# Patient Record
Sex: FEMALE | Race: WHITE | Employment: FULL TIME | URBAN - METROPOLITAN AREA
[De-identification: names, ages, dates, MRNs, and addresses within clinical notes are randomized per-mention and may not be internally consistent; named-entity substitution may affect disease eponyms.]

---

## 2017-02-21 PROCEDURE — 82784 ASSAY IGA/IGD/IGG/IGM EACH: CPT | Performed by: FAMILY MEDICINE

## 2017-02-21 PROCEDURE — 86255 FLUORESCENT ANTIBODY SCREEN: CPT | Performed by: FAMILY MEDICINE

## 2017-02-21 PROCEDURE — 36415 COLL VENOUS BLD VENIPUNCTURE: CPT | Performed by: FAMILY MEDICINE

## 2017-02-21 PROCEDURE — 83516 IMMUNOASSAY NONANTIBODY: CPT | Performed by: FAMILY MEDICINE

## 2017-05-30 PROBLEM — H81.11 BPPV (BENIGN PAROXYSMAL POSITIONAL VERTIGO), RIGHT: Status: ACTIVE | Noted: 2017-05-30

## 2019-09-20 PROCEDURE — 88175 CYTOPATH C/V AUTO FLUID REDO: CPT | Performed by: INTERNAL MEDICINE

## 2020-11-18 PROBLEM — G44.229 HEADACHE, TENSION TYPE, CHRONIC: Status: ACTIVE | Noted: 2020-11-18

## 2024-07-13 ENCOUNTER — HOSPITAL ENCOUNTER (OUTPATIENT)
Age: 64
Discharge: HOME | End: 2024-07-13
Payer: MEDICAID

## 2024-07-13 VITALS
DIASTOLIC BLOOD PRESSURE: 70 MMHG | TEMPERATURE: 99 F | SYSTOLIC BLOOD PRESSURE: 143 MMHG | HEART RATE: 74 BPM | OXYGEN SATURATION: 98 % | RESPIRATION RATE: 18 BRPM

## 2024-07-13 DIAGNOSIS — H00.015 HORDEOLUM EXTERNUM OF LEFT LOWER EYELID: Primary | ICD-10-CM

## 2024-07-13 RX ORDER — SUMATRIPTAN 100 MG/1
TABLET, FILM COATED ORAL
COMMUNITY
Start: 2024-06-14

## 2024-07-13 RX ORDER — ERYTHROMYCIN 5 MG/G
1 OINTMENT OPHTHALMIC EVERY 6 HOURS
Qty: 3.5 G | Refills: 0 | Status: SHIPPED | OUTPATIENT
Start: 2024-07-13 | End: 2024-07-20

## 2024-07-13 NOTE — ED INITIAL ASSESSMENT (HPI)
Pt states for a week she had a \"ball on the inside of her left lower lid\".  Pt states today it is much better. Pt is here to be evaluated for her left eye.

## 2024-07-13 NOTE — ED PROVIDER NOTES
Patient Seen in: [facility]      History     Chief Complaint   Patient presents with    Eye Problem     Stated Complaint: Eye Problem    Subjective: 64 year old female, pmh of migraines, presents to IC with complaint of left eye pain for 2 weeks. Patient states she has been applying COOL compresses and cleaning left eye. She believed it was  a pimple or stye. There Is no discharge. No pain. Mild itching. Denies vision changes. Patient does not wear contacts.  Well-appearing.  AOx4.  The history is provided by the patient. The history is limited by a language barrier. A  was used ([id number]).           Objective:   Past Medical History:    Anal fissure    Family history of diabetes mellitus (DM)    Fibroid, uterine    Migraine without aura    Migraines    OTHER DISEASES    hx UTI's    Ovarian cyst    Vitamin D deficiency              Past Surgical History:   Procedure Laterality Date    Other surgical history Right 1966    fx tibia ORIF                Social History     Socioeconomic History    Marital status:    Tobacco Use    Smoking status: Never    Smokeless tobacco: Never   Substance and Sexual Activity    Alcohol use: No     Alcohol/week: 0.0 standard drinks of alcohol    Drug use: No     Social Determinants of Health     Financial Resource Strain: Medium Risk (2/17/2022)    Received from [facility]    Overall Financial Resource Strain (CARDIA)     Difficulty of Paying Living Expenses: Somewhat hard   Food Insecurity: No Food Insecurity (4/11/2024)    Received from [facility]    Food Insecurity     Within the past 30 days, I worried whether my food would run out before I got money to buy more. / En los últimos 30 días, me preocupó que la comida se podía acabar antes de tener dinero para compr...: Never true / Nunca     Within the past 30 days, the food that I bought just didn't last, and I didn't have money to get more. / En los  últimos 30 chayo, La comida que compré no rindió lo suficiente, y no tenía dinero para...: Never true / Nunca    Received from [facility]              Review of Systems   Constitutional: Negative.  Negative for activity change, appetite change, chills, fatigue and fever.   Eyes:  Positive for itching. Negative for photophobia, pain, discharge, redness and visual disturbance.   Respiratory: Negative.     Cardiovascular: Negative.    Skin: Negative.    Neurological: Negative.        Positive for stated Chief Complaint: Eye Problem    Other systems are as noted in HPI.  Constitutional and vital signs reviewed.      All other systems reviewed and negative except as noted above.    Physical Exam     ED Triage Vitals [07/13/24 1036]   /70   Pulse 74   Resp 18   Temp 98.6 °F (37 °C)   Temp src Temporal   SpO2 98 %   O2 Device None (Room air)       Current Vitals:   Vital Signs  BP: 143/70  Pulse: 74  Resp: 18  Temp: 98.6 °F (37 °C)  Temp src: Temporal    Oxygen Therapy  SpO2: 98 %  O2 Device: None (Room air)        Right Eye Chart Acuity: 20/50, Corrected  Left Eye Chart Acuity: 20/50, Corrected    Physical Exam  Constitutional:       General: She is not in acute distress.     Appearance: Normal appearance. She is not ill-appearing or toxic-appearing.   HENT:      Head: Normocephalic.      Nose: Nose normal.   Eyes:      General: Lids are normal. Lids are everted, no foreign bodies appreciated. Vision grossly intact.         Right eye: No discharge.         Left eye: Hordeolum present.No foreign body or discharge.      Extraocular Movements: Extraocular movements intact.      Pupils: Pupils are equal, round, and reactive to light.        Comments: Positive internal hordeolum.  No chemosis or injection.  No foreign body.  Lids and lashes are clear.  EOMs intact.  No pain on rotation with EOMs.  No soft tissue swelling, erythema, warmth of orbit.  No vision changes.  No fever.   Cardiovascular:       Rate and Rhythm: Normal rate.      Pulses: Normal pulses.   Pulmonary:      Effort: Pulmonary effort is normal.   Musculoskeletal:         General: Normal range of motion.      Cervical back: Normal range of motion.   Skin:     General: Skin is warm.      Capillary Refill: Capillary refill takes less than 2 seconds.   Neurological:      General: No focal deficit present.      Mental Status: She is alert and oriented to person, place, and time.               ED Course   Labs Reviewed - No data to display                   MDM      Differentials considered include: Stye, foreign body, chalazion.    No concerns for preseptal or orbital cellulitis.  There is no soft tissue swelling, erythema, warmth of orbit.  Patient can spontaneously open and close eyes.  Becomes intact.  No pain limitation with EOMs.  No entrapment.  No vision changes.  No fever.     No drainage or discharge to lids or lashes.  No evidence of blepharitis or conjunctivitis.    Eyelids everted, no retained foreign body.  Does appear to be small internal hordeolum.  Patient states symptoms have been ongoing for 2 weeks.    Will treat with erythromycin ointment, 4 times a day for 7 days.  Patient is aware of proper installation application.  Encourage patient to switch to warm compresses over cool compresses.    Patient is aware of signs symptoms that warrant ER evaluation.                               Medical Decision Making      Disposition and Plan     Clinical Impression:  1. Hordeolum externum of left lower eyelid         Disposition:  Discharge  7/13/2024 11:12 am    Follow-up:  Valencia Burrell MD  [address]  [place] [address]  [phone]      As needed          Medications Prescribed:  Discharge Medication List as of 7/13/2024 11:02 AM        START taking these medications    Details   erythromycin 5 MG/GM Ophthalmic Ointment Place 1 Application into the left eye every 6 (six) hours for 7 days., Normal, Disp-3.5 g, R-0

## 2024-07-13 NOTE — DISCHARGE INSTRUCTIONS
As discussed, you have a stye of left lower lid.  Antibiotics prescribed, 4 times a day for 7 days.  Apply thin ribbon of ointment from inner corner to outer corner.  Use warm compresses 4 times a day for at least 15 minutes.  I would avoid the use of eye make-up while being treated for stye.    If there is any worsening pain, swelling, redness, warmth of eye with difficulty of eye movement or vision changes with fever, please go to ER.